# Patient Record
Sex: MALE | Race: ASIAN | NOT HISPANIC OR LATINO | URBAN - METROPOLITAN AREA
[De-identification: names, ages, dates, MRNs, and addresses within clinical notes are randomized per-mention and may not be internally consistent; named-entity substitution may affect disease eponyms.]

---

## 2022-08-29 ENCOUNTER — EMERGENCY (EMERGENCY)
Facility: HOSPITAL | Age: 32
LOS: 1 days | Discharge: HOME | End: 2022-08-29
Attending: EMERGENCY MEDICINE | Admitting: EMERGENCY MEDICINE

## 2022-08-29 VITALS
OXYGEN SATURATION: 100 % | RESPIRATION RATE: 16 BRPM | TEMPERATURE: 98 F | HEART RATE: 80 BPM | SYSTOLIC BLOOD PRESSURE: 140 MMHG | DIASTOLIC BLOOD PRESSURE: 94 MMHG

## 2022-08-29 VITALS
HEART RATE: 71 BPM | OXYGEN SATURATION: 100 % | TEMPERATURE: 98 F | SYSTOLIC BLOOD PRESSURE: 141 MMHG | DIASTOLIC BLOOD PRESSURE: 98 MMHG | RESPIRATION RATE: 18 BRPM

## 2022-08-29 LAB
ALBUMIN SERPL ELPH-MCNC: 4.3 G/DL — SIGNIFICANT CHANGE UP (ref 3.3–5)
ALP SERPL-CCNC: 68 U/L — SIGNIFICANT CHANGE UP (ref 40–120)
ALT FLD-CCNC: 36 U/L — SIGNIFICANT CHANGE UP (ref 4–41)
ANION GAP SERPL CALC-SCNC: 9 MMOL/L — SIGNIFICANT CHANGE UP (ref 7–14)
AST SERPL-CCNC: 24 U/L — SIGNIFICANT CHANGE UP (ref 4–40)
BASOPHILS # BLD AUTO: 0.05 K/UL — SIGNIFICANT CHANGE UP (ref 0–0.2)
BASOPHILS NFR BLD AUTO: 0.8 % — SIGNIFICANT CHANGE UP (ref 0–2)
BILIRUB SERPL-MCNC: 0.3 MG/DL — SIGNIFICANT CHANGE UP (ref 0.2–1.2)
BUN SERPL-MCNC: 17 MG/DL — SIGNIFICANT CHANGE UP (ref 7–23)
CALCIUM SERPL-MCNC: 9.6 MG/DL — SIGNIFICANT CHANGE UP (ref 8.4–10.5)
CHLORIDE SERPL-SCNC: 100 MMOL/L — SIGNIFICANT CHANGE UP (ref 98–107)
CO2 SERPL-SCNC: 29 MMOL/L — SIGNIFICANT CHANGE UP (ref 22–31)
CREAT SERPL-MCNC: 0.89 MG/DL — SIGNIFICANT CHANGE UP (ref 0.5–1.3)
EGFR: 117 ML/MIN/1.73M2 — SIGNIFICANT CHANGE UP
EOSINOPHIL # BLD AUTO: 0.14 K/UL — SIGNIFICANT CHANGE UP (ref 0–0.5)
EOSINOPHIL NFR BLD AUTO: 2.2 % — SIGNIFICANT CHANGE UP (ref 0–6)
GLUCOSE SERPL-MCNC: 115 MG/DL — HIGH (ref 70–99)
HCT VFR BLD CALC: 45.6 % — SIGNIFICANT CHANGE UP (ref 39–50)
HGB BLD-MCNC: 14.7 G/DL — SIGNIFICANT CHANGE UP (ref 13–17)
HIV 1+2 AB+HIV1 P24 AG SERPL QL IA: SIGNIFICANT CHANGE UP
IANC: 3.58 K/UL — SIGNIFICANT CHANGE UP (ref 1.8–7.4)
IMM GRANULOCYTES NFR BLD AUTO: 0.6 % — SIGNIFICANT CHANGE UP (ref 0–1.5)
LYMPHOCYTES # BLD AUTO: 1.92 K/UL — SIGNIFICANT CHANGE UP (ref 1–3.3)
LYMPHOCYTES # BLD AUTO: 30 % — SIGNIFICANT CHANGE UP (ref 13–44)
MAGNESIUM SERPL-MCNC: 2.4 MG/DL — SIGNIFICANT CHANGE UP (ref 1.6–2.6)
MCHC RBC-ENTMCNC: 27 PG — SIGNIFICANT CHANGE UP (ref 27–34)
MCHC RBC-ENTMCNC: 32.2 GM/DL — SIGNIFICANT CHANGE UP (ref 32–36)
MCV RBC AUTO: 83.8 FL — SIGNIFICANT CHANGE UP (ref 80–100)
MONOCYTES # BLD AUTO: 0.66 K/UL — SIGNIFICANT CHANGE UP (ref 0–0.9)
MONOCYTES NFR BLD AUTO: 10.3 % — SIGNIFICANT CHANGE UP (ref 2–14)
NEUTROPHILS # BLD AUTO: 3.58 K/UL — SIGNIFICANT CHANGE UP (ref 1.8–7.4)
NEUTROPHILS NFR BLD AUTO: 56.1 % — SIGNIFICANT CHANGE UP (ref 43–77)
NRBC # BLD: 0 /100 WBCS — SIGNIFICANT CHANGE UP (ref 0–0)
NRBC # FLD: 0 K/UL — SIGNIFICANT CHANGE UP (ref 0–0)
PLATELET # BLD AUTO: 226 K/UL — SIGNIFICANT CHANGE UP (ref 150–400)
POTASSIUM SERPL-MCNC: 4.2 MMOL/L — SIGNIFICANT CHANGE UP (ref 3.5–5.3)
POTASSIUM SERPL-SCNC: 4.2 MMOL/L — SIGNIFICANT CHANGE UP (ref 3.5–5.3)
PROT SERPL-MCNC: 8.3 G/DL — SIGNIFICANT CHANGE UP (ref 6–8.3)
RBC # BLD: 5.44 M/UL — SIGNIFICANT CHANGE UP (ref 4.2–5.8)
RBC # FLD: 11.9 % — SIGNIFICANT CHANGE UP (ref 10.3–14.5)
SARS-COV-2 RNA SPEC QL NAA+PROBE: SIGNIFICANT CHANGE UP
SODIUM SERPL-SCNC: 138 MMOL/L — SIGNIFICANT CHANGE UP (ref 135–145)
TSH SERPL-MCNC: 0.93 UIU/ML — SIGNIFICANT CHANGE UP (ref 0.27–4.2)
WBC # BLD: 6.39 K/UL — SIGNIFICANT CHANGE UP (ref 3.8–10.5)
WBC # FLD AUTO: 6.39 K/UL — SIGNIFICANT CHANGE UP (ref 3.8–10.5)

## 2022-08-29 PROCEDURE — 99284 EMERGENCY DEPT VISIT MOD MDM: CPT

## 2022-08-29 PROCEDURE — 73562 X-RAY EXAM OF KNEE 3: CPT | Mod: 26,LT

## 2022-08-29 RX ORDER — LIDOCAINE 4 G/100G
5 CREAM TOPICAL
Qty: 25 | Refills: 0
Start: 2022-08-29 | End: 2022-09-02

## 2022-08-29 RX ORDER — DIPHENHYDRAMINE HYDROCHLORIDE AND LIDOCAINE HYDROCHLORIDE AND ALUMINUM HYDROXIDE AND MAGNESIUM HYDRO
10 KIT ONCE
Refills: 0 | Status: COMPLETED | OUTPATIENT
Start: 2022-08-29 | End: 2022-08-29

## 2022-08-29 RX ORDER — LIDOCAINE 4 G/100G
20 CREAM TOPICAL ONCE
Refills: 0 | Status: COMPLETED | OUTPATIENT
Start: 2022-08-29 | End: 2022-08-29

## 2022-08-29 RX ADMIN — LIDOCAINE 15 MILLILITER(S): 4 CREAM TOPICAL at 19:43

## 2022-08-29 RX ADMIN — DIPHENHYDRAMINE HYDROCHLORIDE AND LIDOCAINE HYDROCHLORIDE AND ALUMINUM HYDROXIDE AND MAGNESIUM HYDRO 10 MILLILITER(S): KIT at 17:31

## 2022-08-29 NOTE — ED PROVIDER NOTE - CLINICAL SUMMARY MEDICAL DECISION MAKING FREE TEXT BOX
31M w/hx DM p/w 2 days of pain with swallowing, 2 weeks of ongoing L knee pain, with buccal ulcers on exam and minimal L knee swelling. Ddx medication effect, MSK pain s/p fall.     Plan:   Labs  XR knee  Magic mouthwash    Dispo:   Discharge pending reassessment

## 2022-08-29 NOTE — ED PROVIDER NOTE - PATIENT PORTAL LINK FT
You can access the FollowMyHealth Patient Portal offered by [facility] by registering at the following website: [email or web address]. By joining [email or web address]’s FollowMyHealth portal, you will also be able to view your health information using other applications (apps) compatible with our system.

## 2022-08-29 NOTE — ED PROVIDER NOTE - ATTENDING CONTRIBUTION TO CARE
agree with fellow note    "31M w/hx DM p/w 2 days of pain with swallowing, 2 weeks of ongoing L knee pain. He reports that the pain began after a fall, with subsequent L knee swelling a few days later. He was evaluated by his PCP and prescribed prednisone. Two days after medication onset he began to note painful mouth ulcers that made it difficult to tolerate po secondary to pain. He denies any dysphagia, fevers, difficulty swallowing solids or liquids, or changes in voice."    PE: well appearing; afebrile; apthous? ulcer on roof of mouth; CTAB/L; s1 s2 no m/r/g abd soft/NT/ND left knee: mild tenderness at medial aspect; no effusion; able to range completely not warm    Imp: no signs or symptoms to consider septic arthritis currently; ulcer in mouth; will check labs and reassess

## 2022-08-29 NOTE — ED PROVIDER NOTE - NS ED ROS FT
Constitutional: no fevers, no chills.  Eyes: no visual changes.  Ears: no ear drainage, no ear pain.  Nose: no nasal congestion.  Mouth/Throat: Small buccal ulcers noted. no sore throat.  Cardiovascular: no chest pain.  Respiratory: no shortness of breath, no wheezing, no cough  Gastrointestinal: no nausea, vomiting, diarrhea or abdominal pain.  MSK: R knee medial tenderness. no flank pain, no back pain.  Genitourinary: no dysuria, no hematuria.  Skin: no rashes.  Neuro: no headache, normal gait.  Psychiatric: no known mental health issues.

## 2022-08-29 NOTE — ED PROVIDER NOTE - NSFOLLOWUPINSTRUCTIONS_ED_ALL_ED_FT
Your x-rays did not show sign of fracture at this time. Follow up with orthopedic surgery as soon as possible, in the next 5-7 days. Return to the ER if you develop high fevers, worsening pain, or are unable to walk due to pain. Use the mouthwash solution to control your mouth pain.

## 2022-08-29 NOTE — ED PROVIDER NOTE - MDM ORDERS SUBMITTED SELECTION
57yo f no pmhx pw left arm numbness since 445am. sudden onset numbness in left arm while working. atraumatic, left shoulder to hand, no weakness Denies recent trauma, fevers, chills, headache, dizziness, nausea, vomiting, dysuria, freq, hematuria, diarrhea, constipation, chest pain, shortness of breath, cough. never had similar sx. took 2 asp at home. code stroke called Labs/Imaging Studies/Medications

## 2022-08-29 NOTE — ED ADULT TRIAGE NOTE - CHIEF COMPLAINT QUOTE
Pt had mechanical fall injuring his Lt knee 1 week ago, pt endorsing pain and swelling to Lt knee. Pt was evaluated by MD who prescribed him medication with no relief of symptoms. Pt started to develop lesions in mouth and was prescribed Augmentin, pt states he still has discomfort in mouth and stopped taking medication. Pt endorses difficulty swallowing, denies difficulty breathing, pt is afebrile, able to ambulate in triage. No PMH.

## 2022-08-29 NOTE — ED PROVIDER NOTE - OBJECTIVE STATEMENT
31M w/hx DM p/w 2 days of pain with swallowing, 2 weeks of ongoing L knee pain. He reports that the pain began after a fall, with subsequent L knee swelling a few days later. He was evaluated by his PCP and prescribed prednisone. Two days after medication onset he began to note painful mouth ulcers that made it difficult to tolerate po secondary to pain. He denies any dysphagia, fevers, difficulty swallowing solids or liquids, or changes in voice.

## 2024-04-06 ENCOUNTER — EMERGENCY (EMERGENCY)
Facility: HOSPITAL | Age: 34
LOS: 1 days | Discharge: HOME | End: 2024-04-06
Attending: EMERGENCY MEDICINE | Admitting: EMERGENCY MEDICINE
Payer: COMMERCIAL

## 2024-04-06 VITALS
HEART RATE: 66 BPM | DIASTOLIC BLOOD PRESSURE: 105 MMHG | SYSTOLIC BLOOD PRESSURE: 127 MMHG | RESPIRATION RATE: 17 BRPM | OXYGEN SATURATION: 100 % | TEMPERATURE: 98 F

## 2024-04-06 VITALS
HEART RATE: 86 BPM | TEMPERATURE: 98 F | RESPIRATION RATE: 1 BRPM | OXYGEN SATURATION: 97 % | DIASTOLIC BLOOD PRESSURE: 89 MMHG | SYSTOLIC BLOOD PRESSURE: 139 MMHG

## 2024-04-06 LAB
A1C WITH ESTIMATED AVERAGE GLUCOSE RESULT: 10.3 % — HIGH (ref 4–5.6)
ALBUMIN SERPL ELPH-MCNC: 4.8 G/DL — SIGNIFICANT CHANGE UP (ref 3.3–5)
ALP SERPL-CCNC: 99 U/L — SIGNIFICANT CHANGE UP (ref 40–120)
ALT FLD-CCNC: 22 U/L — SIGNIFICANT CHANGE UP (ref 4–41)
ANION GAP SERPL CALC-SCNC: 19 MMOL/L — HIGH (ref 7–14)
APPEARANCE UR: CLEAR — SIGNIFICANT CHANGE UP
AST SERPL-CCNC: 21 U/L — SIGNIFICANT CHANGE UP (ref 4–40)
B-OH-BUTYR SERPL-SCNC: 1.9 MMOL/L — HIGH (ref 0–0.4)
BACTERIA # UR AUTO: NEGATIVE /HPF — SIGNIFICANT CHANGE UP
BASOPHILS # BLD AUTO: 0.11 K/UL — SIGNIFICANT CHANGE UP (ref 0–0.2)
BASOPHILS NFR BLD AUTO: 1.2 % — SIGNIFICANT CHANGE UP (ref 0–2)
BILIRUB SERPL-MCNC: 0.3 MG/DL — SIGNIFICANT CHANGE UP (ref 0.2–1.2)
BILIRUB UR-MCNC: NEGATIVE — SIGNIFICANT CHANGE UP
BLOOD GAS VENOUS COMPREHENSIVE RESULT: SIGNIFICANT CHANGE UP
BUN SERPL-MCNC: 16 MG/DL — SIGNIFICANT CHANGE UP (ref 7–23)
CALCIUM SERPL-MCNC: 10.3 MG/DL — SIGNIFICANT CHANGE UP (ref 8.4–10.5)
CAST: 1 /LPF — SIGNIFICANT CHANGE UP (ref 0–4)
CHLORIDE SERPL-SCNC: 100 MMOL/L — SIGNIFICANT CHANGE UP (ref 98–107)
CO2 SERPL-SCNC: 18 MMOL/L — LOW (ref 22–31)
COLOR SPEC: YELLOW — SIGNIFICANT CHANGE UP
CREAT SERPL-MCNC: 0.91 MG/DL — SIGNIFICANT CHANGE UP (ref 0.5–1.3)
DIFF PNL FLD: NEGATIVE — SIGNIFICANT CHANGE UP
EGFR: 113 ML/MIN/1.73M2 — SIGNIFICANT CHANGE UP
EOSINOPHIL # BLD AUTO: 0.24 K/UL — SIGNIFICANT CHANGE UP (ref 0–0.5)
EOSINOPHIL NFR BLD AUTO: 2.6 % — SIGNIFICANT CHANGE UP (ref 0–6)
ESTIMATED AVERAGE GLUCOSE: 249 — SIGNIFICANT CHANGE UP
GLUCOSE SERPL-MCNC: 222 MG/DL — HIGH (ref 70–99)
GLUCOSE UR QL: >=1000 MG/DL
HCT VFR BLD CALC: 47.3 % — SIGNIFICANT CHANGE UP (ref 39–50)
HGB BLD-MCNC: 16.9 G/DL — SIGNIFICANT CHANGE UP (ref 13–17)
IANC: 5.76 K/UL — SIGNIFICANT CHANGE UP (ref 1.8–7.4)
IMM GRANULOCYTES NFR BLD AUTO: 0.7 % — SIGNIFICANT CHANGE UP (ref 0–0.9)
KETONES UR-MCNC: ABNORMAL MG/DL
LEUKOCYTE ESTERASE UR-ACNC: NEGATIVE — SIGNIFICANT CHANGE UP
LYMPHOCYTES # BLD AUTO: 2.44 K/UL — SIGNIFICANT CHANGE UP (ref 1–3.3)
LYMPHOCYTES # BLD AUTO: 26.7 % — SIGNIFICANT CHANGE UP (ref 13–44)
MCHC RBC-ENTMCNC: 28.7 PG — SIGNIFICANT CHANGE UP (ref 27–34)
MCHC RBC-ENTMCNC: 35.7 GM/DL — SIGNIFICANT CHANGE UP (ref 32–36)
MCV RBC AUTO: 80.4 FL — SIGNIFICANT CHANGE UP (ref 80–100)
MONOCYTES # BLD AUTO: 0.52 K/UL — SIGNIFICANT CHANGE UP (ref 0–0.9)
MONOCYTES NFR BLD AUTO: 5.7 % — SIGNIFICANT CHANGE UP (ref 2–14)
NEUTROPHILS # BLD AUTO: 5.76 K/UL — SIGNIFICANT CHANGE UP (ref 1.8–7.4)
NEUTROPHILS NFR BLD AUTO: 63.1 % — SIGNIFICANT CHANGE UP (ref 43–77)
NITRITE UR-MCNC: NEGATIVE — SIGNIFICANT CHANGE UP
NRBC # BLD: 0 /100 WBCS — SIGNIFICANT CHANGE UP (ref 0–0)
NRBC # FLD: 0 K/UL — SIGNIFICANT CHANGE UP (ref 0–0)
PH UR: 5.5 — SIGNIFICANT CHANGE UP (ref 5–8)
PLATELET # BLD AUTO: 266 K/UL — SIGNIFICANT CHANGE UP (ref 150–400)
POTASSIUM SERPL-MCNC: 4.2 MMOL/L — SIGNIFICANT CHANGE UP (ref 3.5–5.3)
POTASSIUM SERPL-SCNC: 4.2 MMOL/L — SIGNIFICANT CHANGE UP (ref 3.5–5.3)
PROT SERPL-MCNC: 8.4 G/DL — HIGH (ref 6–8.3)
PROT UR-MCNC: NEGATIVE MG/DL — SIGNIFICANT CHANGE UP
RBC # BLD: 5.88 M/UL — HIGH (ref 4.2–5.8)
RBC # FLD: 11.7 % — SIGNIFICANT CHANGE UP (ref 10.3–14.5)
RBC CASTS # UR COMP ASSIST: 0 /HPF — SIGNIFICANT CHANGE UP (ref 0–4)
SODIUM SERPL-SCNC: 137 MMOL/L — SIGNIFICANT CHANGE UP (ref 135–145)
SP GR SPEC: 1.04 — HIGH (ref 1–1.03)
SQUAMOUS # UR AUTO: 0 /HPF — SIGNIFICANT CHANGE UP (ref 0–5)
UROBILINOGEN FLD QL: 0.2 MG/DL — SIGNIFICANT CHANGE UP (ref 0.2–1)
WBC # BLD: 9.13 K/UL — SIGNIFICANT CHANGE UP (ref 3.8–10.5)
WBC # FLD AUTO: 9.13 K/UL — SIGNIFICANT CHANGE UP (ref 3.8–10.5)
WBC UR QL: 1 /HPF — SIGNIFICANT CHANGE UP (ref 0–5)

## 2024-04-06 PROCEDURE — 93010 ELECTROCARDIOGRAM REPORT: CPT

## 2024-04-06 PROCEDURE — 99284 EMERGENCY DEPT VISIT MOD MDM: CPT

## 2024-04-06 RX ORDER — SODIUM CHLORIDE 9 MG/ML
1000 INJECTION, SOLUTION INTRAVENOUS ONCE
Refills: 0 | Status: COMPLETED | OUTPATIENT
Start: 2024-04-06 | End: 2024-04-06

## 2024-04-06 RX ADMIN — SODIUM CHLORIDE 1000 MILLILITER(S): 9 INJECTION, SOLUTION INTRAVENOUS at 19:52

## 2024-04-06 NOTE — ED PROVIDER NOTE - NSFOLLOWUPCLINICS_GEN_ALL_ED_FT
[facility] Endocrinology  Endocrinology  [address] [place] [address]  Phone: [phone]  Fax:

## 2024-04-06 NOTE — ED PROVIDER NOTE - PROGRESS NOTE DETAILS
Bhargav Taylor MD PGY2: Pt now feels improvement to symptoms and no longer has diplopia. Labs suggestive of uncontrolled hyperglycemia, no dka. Discussed dc, follow up, return precautions, meds. Understands and is amenable at this time.

## 2024-04-06 NOTE — ED PROVIDER NOTE - PATIENT PORTAL LINK FT
You can access the FollowMyHealth Patient Portal offered by [facility] by registering at the following website: [email or web address]. By joining [employer]’s FollowMyHealth portal, you will also be able to view your health information using other applications (apps) compatible with our system.

## 2024-04-06 NOTE — ED ADULT NURSE REASSESSMENT NOTE - NS ED NURSE REASSESS COMMENT FT1
Received report from Day RN Bonnie Higgins: Pt A&Ox4, ambulatory, NAD, respirations are even and unlabored, no complaints at this moment, VS noted, MD made aware of VS. Pending reassessment. Safety precautions implemented as per protocol, awaiting further MD orders, plan of care ongoing.

## 2024-04-06 NOTE — ED ADULT TRIAGE NOTE - CHIEF COMPLAINT QUOTE
Pt presents to ED via EMS from home with c/o dizziness and shaky feeling x 1 hour prior to arrival. Pt has hx of DM and was found to have BGL of 390 by EMS. Pt reports recent adjustment in DM meds. Pt denies numbness, tingling. Endorses blurry vision and generalized weakness x 2 hours. MD called for Stroke eval advised not to activate at this time.

## 2024-04-06 NOTE — ED PROVIDER NOTE - NSFOLLOWUPINSTRUCTIONS_ED_ALL_ED_FT
You were seen in the ED for dizziness and vision changes    Your evaluation showed no findings requiring further evaluation or treatment in the hospital at this time.    You were found to have high blood sugar and high A1c. Please take your prescribed diabetes medications as you have been instructed.    Please follow up with your PCP as discussed within 1 week. Discuss your symptoms and results, which are included in this packet. Please also follow up with an endocrinologist, see attached contact info to schedule an appointment.    Seek immediate medical attention if you experience new or worsening symptoms, weakness or numbness to one side of the body, new speech difficulty or difficulty walking, worsening vision changes.

## 2024-04-06 NOTE — ED ADULT NURSE NOTE - NSFALLRISKINTERV_ED_ALL_ED
Assistance OOB with selected safe patient handling equipment if applicable/Assistance with ambulation/Communicate fall risk and risk factors to all staff, patient, and family/Monitor gait and stability/Provide visual cue: yellow wristband, yellow gown, etc/Reinforce activity limits and safety measures with patient and family/Call bell, personal items and telephone in reach/Instruct patient to call for assistance before getting out of bed/chair/stretcher/Non-slip footwear applied when patient is off stretcher/[place] to call system/Physically safe environment - no spills, clutter or unnecessary equipment/Purposeful Proactive Rounding/Room/bathroom lighting operational, light cord in reach

## 2024-04-06 NOTE — ED PROVIDER NOTE - CLINICAL SUMMARY MEDICAL DECISION MAKING FREE TEXT BOX
Patient is a 33-year-old male past medical history significant for diabetes, hyperlipidemia presenting for shakiness and dizziness.   With vital signs currently within normal limits and stable, fingerstick in triage to 90, with symptoms including weakness and shakiness, found on exam to have monocular diplopia to the right eye, though had similar symptoms and findings reportedly with uncontrolled hyperglycemia in the past.  Most likely with recurrent uncontrolled hyperglycemia, with polyuria more likely attributed to that rather than infectious etiology, with some revision changes for ocular etiology versus hyperglycemia.  To evaluate labs, urine, give fluids and reassess.  If persistent vision complaints, may consider consulting ophthalmology. No other findings suggestive of acute intracerebral/neurologic cause of symptoms at this time.

## 2024-04-06 NOTE — ED ADULT NURSE NOTE - OBJECTIVE STATEMENT
Pt received to room 29 A&Ox4, ambulatory at baseline, Hx DM2 (compliant with medication) accompanied by family member coming to the ED for c/o unsteady gait and dizziness and shaky feeling x 1 hour prior to arrival. Endorsing posterior headache at this time.  Pt denies numbness, tingling, chest pain, sob, N/V/D, abdominal pain, fevers, chills. Endorses blurry vision bilaterally and generalized weakness x 2 hours. RR equal and unlabored. Placed on cardiac monitor. Abdomen soft, non-tender, non-distended. +strength and + sensation bilaterally in the upper and lower extremities. No slurred speech, no drift, no facial asymmetry noted. 20G IV placed in the L AC, labs drawn and sent. Care plan continued. Comfort measures provided. Safety maintained. Awaiting lab results and imaging.

## 2024-04-06 NOTE — ED PROVIDER NOTE - PHYSICAL EXAMINATION
CONSTITUTIONAL: awake; in no acute distress.   SKIN: warm, dry  HEAD: Normocephalic; atraumatic.  EYES: no conjunctival injection. PERRL. EOMI. Visual fields intact. R eye monocular diplopia, normal to L eye. Visual acuity 20/30 OD/OS/OU  ENT: No nasal discharge; airway clear. MM Dry  CARD: S1, S2 normal; no murmurs, gallops, or rubs. Regular rate and rhythm.   RESP: No wheezes, rales or rhonchi. Good air movement bilaterally.   ABD: soft ntnd, no guarding, no distention, no rigidity.   EXT: Ambulates independently.  No cyanosis or edema.   NEURO: alert, oriented to ppte, cn 2-12 intact except eyes as above, motor strength 5/5 throughout, sensation intact throughout, coordination intact

## 2024-04-06 NOTE — ED PROVIDER NOTE - OBJECTIVE STATEMENT
Patient is a 33-year-old male past medical history significant for diabetes, hyperlipidemia presenting for shakiness and dizziness.  Patient states that since earlier today for approximately 2 to 3 hours he has been experiencing generalized weakness, diffuse unsteadiness type of dizziness without room spinning sensation, and a shaky feeling that he is felt similar in the past when he has had high blood sugar and uncontrolled diabetes.  States that he measured his blood glucose at home to 390, and was brought in by EMS for further evaluation.  Additionally states in this time he has been having fuzzy vision, does not attribute it to either eye individually, is able to see and focus on computer screens and phone screens.  Says that this has previously happened in the past when he has had uncontrolled diabetes, previously seen an ophthalmologist for it but has no longer needed any glasses or other correction for his vision since improved control of his diabetes.  Does not have any unilateral weakness, numbness, tingling.  No pain anywhere.  No fevers, chills, cough, sore throat.  No recent travel, sick contacts.  Says that approximately 1.5 weeks ago he had URI symptoms but they resolved about a week ago.  Endorses polydipsia and polyuria without dysuria.  Feels that his mouth is dry.  Has not had any changes to appetite, states that he has tried controlling his diet well.  Recently was supposed to uptitrate his metformin, first day would be today, otherwise takes Farxiga for his diabetes. Patient is a 33-year-old male past medical history significant for diabetes, hyperlipidemia presenting for shakiness and dizziness.  Patient states that since earlier today for approximately 2 to 3 hours he has been experiencing generalized weakness, diffuse unsteadiness type of dizziness without room spinning sensation, and a shaky feeling that he is felt similar in the past when he has had high blood sugar and uncontrolled diabetes.  States that he measured his blood glucose at home to 390, and was brought in by EMS for further evaluation.  Additionally states in this time he has been having fuzzy vision, does not attribute it to either eye individually, is able to see and focus on computer screens and phone screens.  Says that this has previously happened in the past when he has had uncontrolled diabetes, previously seen an ophthalmologist for it but has no longer needed any glasses or other correction for his vision since improved control of his diabetes.  Does not have any unilateral weakness, numbness, tingling.  No pain anywhere.  No fevers, chills, cough, sore throat.  No recent travel, sick contacts.  Says that approximately 1.5 weeks ago he had URI symptoms but they resolved about a week ago.  Endorses polydipsia and polyuria without dysuria.  Feels that his mouth is dry.  Has not had any changes to appetite, states that he has tried controlling his diet well.  Recently was supposed to uptitrate his metformin, first day would be today, otherwise takes Farxiga for his diabetes.    Attendinyo male presents with blurry vision and dizziness associated with high glucose.  states this has happened before when the sugar was elevated.  no fever or chills.  tolerating po well.

## 2024-04-07 PROBLEM — E11.9 TYPE 2 DIABETES MELLITUS WITHOUT COMPLICATIONS: Chronic | Status: ACTIVE | Noted: 2022-08-29

## 2024-04-08 LAB
CULTURE RESULTS: SIGNIFICANT CHANGE UP
SPECIMEN SOURCE: SIGNIFICANT CHANGE UP

## 2024-11-15 ENCOUNTER — EMERGENCY (EMERGENCY)
Facility: HOSPITAL | Age: 34
LOS: 1 days | Discharge: HOME | End: 2024-11-15
Attending: STUDENT IN AN ORGANIZED HEALTH CARE EDUCATION/TRAINING PROGRAM | Admitting: STUDENT IN AN ORGANIZED HEALTH CARE EDUCATION/TRAINING PROGRAM
Payer: COMMERCIAL

## 2024-11-15 VITALS
WEIGHT: 197.98 LBS | HEART RATE: 65 BPM | OXYGEN SATURATION: 100 % | TEMPERATURE: 98 F | SYSTOLIC BLOOD PRESSURE: 128 MMHG | RESPIRATION RATE: 16 BRPM | DIASTOLIC BLOOD PRESSURE: 90 MMHG

## 2024-11-15 PROCEDURE — 99284 EMERGENCY DEPT VISIT MOD MDM: CPT

## 2024-11-15 NOTE — ED ADULT TRIAGE NOTE - CHIEF COMPLAINT QUOTE
Pt c/o penile pain and swelling x 1month, pt state pain progressively got worse in the last week. denies any discharge. No complaints of chest pain, headache, nausea, dizziness, vomiting  SOB, fever, chills verbalized..

## 2024-11-16 VITALS
DIASTOLIC BLOOD PRESSURE: 66 MMHG | RESPIRATION RATE: 16 BRPM | OXYGEN SATURATION: 100 % | TEMPERATURE: 99 F | HEART RATE: 80 BPM | SYSTOLIC BLOOD PRESSURE: 104 MMHG

## 2024-11-16 LAB
APPEARANCE UR: CLEAR — SIGNIFICANT CHANGE UP
BILIRUB UR-MCNC: NEGATIVE — SIGNIFICANT CHANGE UP
COLOR SPEC: YELLOW — SIGNIFICANT CHANGE UP
DIFF PNL FLD: NEGATIVE — SIGNIFICANT CHANGE UP
GLUCOSE UR QL: >=1000 MG/DL
KETONES UR-MCNC: NEGATIVE MG/DL — SIGNIFICANT CHANGE UP
LEUKOCYTE ESTERASE UR-ACNC: NEGATIVE — SIGNIFICANT CHANGE UP
NITRITE UR-MCNC: NEGATIVE — SIGNIFICANT CHANGE UP
PH UR: 6 — SIGNIFICANT CHANGE UP (ref 5–8)
PROT UR-MCNC: NEGATIVE MG/DL — SIGNIFICANT CHANGE UP
SP GR SPEC: 1.04 — HIGH (ref 1–1.03)
UROBILINOGEN FLD QL: 0.2 MG/DL — SIGNIFICANT CHANGE UP (ref 0.2–1)

## 2024-11-16 RX ORDER — LEVOFLOXACIN 750 MG/1
1 TABLET, FILM COATED ORAL
Qty: 5 | Refills: 0
Start: 2024-11-16 | End: 2024-11-20

## 2024-11-16 NOTE — ED PROVIDER NOTE - NSFOLLOWUPINSTRUCTIONS_ED_ALL_ED_FT
1) Please follow up with your Primary Care Provider in 24-48 hours  2) Seek immediate medical care for any new or returning symptoms including but not limited severe pain, high fevers, worsening swelling  3) Take Levaquin once a day for 5 days.  Take this medication with food and/or probiotics.  Avoid strenuous activity while on this medication.  4) Follow up with Urology

## 2024-11-16 NOTE — ED ADULT NURSE NOTE - OBJECTIVE STATEMENT
Pt received in intake, aaox3, ambulatory, breathing even and unlabored in bed. Pt c/o penile pain for the past few days. Pt denies chest pain, SOB, dizziness, headache, blurry vision, chills or trouble urinating. Bed in lowest position, call bell within reach.

## 2024-11-16 NOTE — ED PROVIDER NOTE - CLINICAL SUMMARY MEDICAL DECISION MAKING FREE TEXT BOX
Rivera Diaz, DO: 35 yo m pmh htn, hld, pw penile pain. pw wife bedside who provides collateral. Reports 1 month of dysuria, penile pain.  Saw PCP 1 month prior received Augmentin.  Persistent symptoms since then.  Has not seen urology.  Denies N/B,/C, rectal pain, testicular pain, constitutional symptoms.  Patient was HDS, well-appearing, neurovasc intact, PE as noted.  Localized swelling.  Will check urine, likely have patient follow-up with urology.  Foreskin retracted and returned. Rivera Diaz, DO: 35 yo m pmh htn, hld, pw penile pain. pw wife bedside who provides collateral. Reports 1 month of dysuria, penile pain.  Saw PCP 1 month prior received Augmentin.  Persistent symptoms since then.  Has not seen urology.  Denies N/B,/C, rectal pain, testicular pain, constitutional symptoms.  Patient was HDS, well-appearing, neurovasc intact, PE as noted.  Localized swelling.  Will check urine, likely have patient follow-up with urology.  Foreskin retracted and returned. denies constitutional sx.

## 2024-11-16 NOTE — ED PROVIDER NOTE - HIV OFFER
Previously Declined (within the last year) Implemented All Universal Safety Interventions:  [place] to call system. Call bell, personal items and telephone within reach. Instruct patient to call for assistance. Room bathroom lighting operational. Non-slip footwear when patient is off stretcher. Physically safe environment: no spills, clutter or unnecessary equipment. Stretcher in lowest position, wheels locked, appropriate side rails in place.

## 2024-11-16 NOTE — ED PROVIDER NOTE - PROGRESS NOTE DETAILS
Rivera Diaz, DO: Patient reassessed, NAD, non-toxic appearing. results dw pt/family, questions answered. negative ua however has sx so will tx. given precautions on levaquin. to f/u with pcp and uro. dc w/ strict return precautions.

## 2024-11-16 NOTE — ED PROVIDER NOTE - PHYSICAL EXAMINATION
General: nad  HEENT: EOMI, PERRLA, normal mucosa, normal oropharynx, no lesions on the lips or on oral mucosa, normal external ear  Neck: supple, no lymphadenopathy, full range of motion, no nuchal rigidity  CV: RRR, normal S1 and S2 with no murmur, capillary refill less than two seconds  Resp: lungs CTA b/l, good aeration bilaterally, symmetric chest wall   Abd: non-distended, soft, non-tender  : no CVA tenderness, Testicular exam with normal lie and CMR bilaterally, no significant erythema, tenderness or swelling appreciated. No appreciable inguinal hernia bilaterally. No rashes or lesions. No penile discharge. No blood at meatus. 2 mm nodular swelling and ttp in midshaft of dorsum of penile shaft(chaperone- [place], ED-t)   MSK: no deformities   Neuro: no deficits   Skin: no rashes, skin intact

## 2024-11-16 NOTE — ED PROVIDER NOTE - PATIENT PORTAL LINK FT
You can access the FollowMyHealth Patient Portal offered by [facility] by registering at the following website: [email or web address]. By joining [employer]’s FollowMyHealth portal, you will also be able to view your health information using other applications (apps) compatible with our system.

## 2024-11-16 NOTE — ED ADULT NURSE REASSESSMENT NOTE - NS ED NURSE REASSESS COMMENT FT1
Pt s/p evaluation and tx. Discharged received by provider.  Pt ambulated off unit with no s/s of distress noted.

## 2024-11-18 LAB
C TRACH RRNA SPEC QL NAA+PROBE: SIGNIFICANT CHANGE UP
N GONORRHOEA RRNA SPEC QL NAA+PROBE: SIGNIFICANT CHANGE UP
SPECIMEN SOURCE: SIGNIFICANT CHANGE UP

## 2025-01-22 ENCOUNTER — APPOINTMENT (OUTPATIENT)
Dept: UROLOGY | Facility: CLINIC | Age: 35
End: 2025-01-22

## 2025-01-28 ENCOUNTER — APPOINTMENT (OUTPATIENT)
Dept: UROLOGY | Facility: CLINIC | Age: 35
End: 2025-01-28
Payer: COMMERCIAL

## 2025-01-28 VITALS
HEIGHT: 72 IN | SYSTOLIC BLOOD PRESSURE: 139 MMHG | WEIGHT: 195 LBS | HEART RATE: 72 BPM | DIASTOLIC BLOOD PRESSURE: 101 MMHG | BODY MASS INDEX: 26.41 KG/M2

## 2025-01-28 DIAGNOSIS — N48.6 INDURATION PENIS PLASTICA: ICD-10-CM

## 2025-01-28 DIAGNOSIS — N48.89 OTHER SPECIFIED DISORDERS OF PENIS: ICD-10-CM

## 2025-01-28 PROBLEM — Z00.00 ENCOUNTER FOR PREVENTIVE HEALTH EXAMINATION: Status: ACTIVE | Noted: 2025-01-28

## 2025-01-28 PROCEDURE — 99204 OFFICE O/P NEW MOD 45 MIN: CPT

## 2025-01-28 RX ORDER — NAPROXEN 500 MG/1
500 TABLET ORAL
Qty: 30 | Refills: 0 | Status: ACTIVE | COMMUNITY
Start: 2025-01-28 | End: 1900-01-01

## 2025-01-30 LAB
APPEARANCE: CLEAR
BACTERIA UR CULT: NORMAL
BACTERIA: NEGATIVE /HPF
BILIRUBIN URINE: NEGATIVE
BLOOD URINE: NEGATIVE
C TRACH RRNA SPEC QL NAA+PROBE: NOT DETECTED
CAST: 0 /LPF
COLOR: YELLOW
EPITHELIAL CELLS: 0 /HPF
GLUCOSE QUALITATIVE U: >=1000 MG/DL
KETONES URINE: NEGATIVE MG/DL
LEUKOCYTE ESTERASE URINE: NEGATIVE
MICROSCOPIC-UA: NORMAL
N GONORRHOEA RRNA SPEC QL NAA+PROBE: NOT DETECTED
NITRITE URINE: NEGATIVE
PH URINE: 5.5
PROTEIN URINE: NEGATIVE MG/DL
RED BLOOD CELLS URINE: 0 /HPF
SOURCE AMPLIFICATION: NORMAL
SPECIFIC GRAVITY URINE: >1.03
UROBILINOGEN URINE: 0.2 MG/DL
WHITE BLOOD CELLS URINE: 0 /HPF

## 2025-02-03 PROBLEM — N48.6 PEYRONIE'S DISEASE: Status: ACTIVE | Noted: 2025-02-03

## 2025-02-03 PROBLEM — N48.89 PENILE PAIN: Status: ACTIVE | Noted: 2025-02-03

## 2025-02-03 RX ORDER — METFORMIN HYDROCHLORIDE 1000 MG/1
1000 TABLET, COATED ORAL DAILY
Refills: 0 | Status: ACTIVE | COMMUNITY

## 2025-02-12 ENCOUNTER — APPOINTMENT (OUTPATIENT)
Dept: UROLOGY | Facility: CLINIC | Age: 35
End: 2025-02-12

## 2025-02-12 VITALS
HEIGHT: 72 IN | TEMPERATURE: 98.2 F | DIASTOLIC BLOOD PRESSURE: 98 MMHG | HEART RATE: 65 BPM | BODY MASS INDEX: 26.41 KG/M2 | WEIGHT: 195 LBS | SYSTOLIC BLOOD PRESSURE: 150 MMHG | OXYGEN SATURATION: 99 %

## 2025-02-12 PROCEDURE — 52000 CYSTOURETHROSCOPY: CPT

## 2025-02-12 PROCEDURE — A4649 SURGICAL SUPPLIES: CPT

## 2025-02-24 PROBLEM — R30.0 DYSURIA: Status: ACTIVE | Noted: 2025-02-24

## 2025-02-25 ENCOUNTER — APPOINTMENT (OUTPATIENT)
Dept: UROLOGY | Facility: CLINIC | Age: 35
End: 2025-02-25

## 2025-03-21 ENCOUNTER — APPOINTMENT (OUTPATIENT)
Dept: UROLOGY | Facility: CLINIC | Age: 35
End: 2025-03-21